# Patient Record
Sex: MALE | Race: WHITE | NOT HISPANIC OR LATINO | URBAN - METROPOLITAN AREA
[De-identification: names, ages, dates, MRNs, and addresses within clinical notes are randomized per-mention and may not be internally consistent; named-entity substitution may affect disease eponyms.]

---

## 2017-10-03 ENCOUNTER — IMPORTED ENCOUNTER (OUTPATIENT)
Dept: URBAN - METROPOLITAN AREA CLINIC 1 | Facility: CLINIC | Age: 77
End: 2017-10-03

## 2017-10-03 PROBLEM — H16.143: Noted: 2017-10-03

## 2017-10-03 PROBLEM — Z96.1: Noted: 2017-10-03

## 2017-10-03 PROBLEM — H01.001: Noted: 2017-10-03

## 2017-10-03 PROBLEM — H35.013: Noted: 2017-10-03

## 2017-10-03 PROBLEM — H01.004: Noted: 2017-10-03

## 2017-10-03 PROBLEM — H04.123: Noted: 2017-10-03

## 2017-10-03 PROBLEM — H26.493: Noted: 2017-10-03

## 2017-10-03 PROCEDURE — 92014 COMPRE OPH EXAM EST PT 1/>: CPT

## 2017-10-03 NOTE — PATIENT DISCUSSION
1.  BELL w/ PEK OU- (H/o Plugs LLs OU- plugs out LLs OU) Increase ATs to QID OU routinely. Cont AT Gel nataliia QHS OU. 2.  Anterior Blepharitis OU - Cont Daily warm compresses and lid scrubs were recommended. 3. PCO OU: (Posterior Capsule Opacification)   Observe  4. Pseudophakia OU - (Toric OU) 5. Athero Vascular Dz OU- observe. 6.  Glaucoma Suspect OU : (CD 0.55/0.4) Neg FM Hx. Past w/u negative. Cont to observe. Patient is considered Low Risk. 7.  H/o UL Blepharoplasty 8. Pterygium OS -- Patient should wear Sunglasses when exposed to UV light. Letter to PCP Return for an appointment in 1 yr 27 with Dr. Maria Luisa Villasenor.

## 2018-10-01 ENCOUNTER — IMPORTED ENCOUNTER (OUTPATIENT)
Dept: URBAN - METROPOLITAN AREA CLINIC 1 | Facility: CLINIC | Age: 78
End: 2018-10-01

## 2018-10-01 PROBLEM — H01.004: Noted: 2018-10-01

## 2018-10-01 PROBLEM — H01.001: Noted: 2018-10-01

## 2018-10-01 PROBLEM — Z96.1: Noted: 2018-10-01

## 2018-10-01 PROBLEM — H11.002: Noted: 2018-10-01

## 2018-10-01 PROBLEM — H16.143: Noted: 2018-10-01

## 2018-10-01 PROBLEM — H26.493: Noted: 2018-10-01

## 2018-10-01 PROBLEM — H35.013: Noted: 2018-10-01

## 2018-10-01 PROBLEM — H04.123: Noted: 2018-10-01

## 2018-10-01 PROBLEM — H40.013: Noted: 2018-10-01

## 2018-10-01 PROCEDURE — 92014 COMPRE OPH EXAM EST PT 1/>: CPT

## 2018-10-01 NOTE — PATIENT DISCUSSION
1.  BELL w/ PEK OU- (H/o Plugs LLs OU- puncta open OU) Recommend ATs QID OU routinely. Cont AT Gel nataliia QHS OU. 2.  Anterior Blepharitis OU - Daily warm compresses and lid scrubs were recommended. 3. PCO OU: (Posterior Capsule Opacification)   Observe and consider yag cap when pt feels pco visually significant and visual acuity decreases to appropriate level. 4. Pseudophakia OU - (Toric OU) 5. Glaucoma Suspect OU (CD 0.60/0.40): Past w/u negative. Patient is considered Low Risk. Condition was discussed with patient and patient understands. Will continue to monitor patient for any progression in condition. Patient was advised to call us with any problems questions or concerns. 6.  GR I Athero Vascular Dz OU7. Pterygium OS -- Use Sunglasses when exposed to UV light. 8.  H/o UL BlepharoplastyReturn for an appointment in 1 year 27 with Dr. Avelina Figueroa.

## 2019-10-01 ENCOUNTER — IMPORTED ENCOUNTER (OUTPATIENT)
Dept: URBAN - METROPOLITAN AREA CLINIC 1 | Facility: CLINIC | Age: 79
End: 2019-10-01

## 2019-10-01 PROBLEM — H26.493: Noted: 2019-10-01

## 2019-10-01 PROBLEM — Z96.1: Noted: 2019-10-01

## 2019-10-01 PROBLEM — H01.001: Noted: 2019-10-01

## 2019-10-01 PROBLEM — H40.013: Noted: 2019-10-01

## 2019-10-01 PROBLEM — H16.143: Noted: 2019-10-01

## 2019-10-01 PROBLEM — H35.361: Noted: 2019-10-01

## 2019-10-01 PROBLEM — H04.123: Noted: 2019-10-01

## 2019-10-01 PROBLEM — H01.004: Noted: 2019-10-01

## 2019-10-01 PROCEDURE — 92014 COMPRE OPH EXAM EST PT 1/>: CPT

## 2019-10-01 NOTE — PATIENT DISCUSSION
1.  Anterior Blepharitis OU - Continue Daily Hot compresses and lid scrubs were recommended. 2. BELL w/ PEK OU- (H/o Plugs LLs OU- puncta open OU) Recommend ATs QID OU routinely. Cont AT Gel nataliia QHS OU. 3.  Macular Drusen OD- (New) Observe. 4.  PCO OU: (Posterior Capsule Opacification)   Observe  5. Glaucoma Suspect OU (CD 0.60/0.40): Past w/u negative. 6.  Pseudophakia OU - (Toric OU) 7. GR I Athero Vascular Dz OU- stable Cont Cholesterol control. 8.  Pterygium OS -- Use Sunglasses when exposed to UV light. 9.  H/o UL BlepharoplastyLetter to PCP x deferredReturn for an appointment in 1 yr 27 with Dr. Silvio Uriarte.

## 2021-01-05 ENCOUNTER — IMPORTED ENCOUNTER (OUTPATIENT)
Dept: URBAN - METROPOLITAN AREA CLINIC 1 | Facility: CLINIC | Age: 81
End: 2021-01-05

## 2021-01-05 PROBLEM — H16.143: Noted: 2021-01-05

## 2021-01-05 PROBLEM — H26.493: Noted: 2021-01-05

## 2021-01-05 PROBLEM — H01.001: Noted: 2021-01-05

## 2021-01-05 PROBLEM — H01.004: Noted: 2021-01-05

## 2021-01-05 PROBLEM — H04.123: Noted: 2021-01-05

## 2021-01-05 PROCEDURE — 92014 COMPRE OPH EXAM EST PT 1/>: CPT

## 2021-01-05 NOTE — PATIENT DISCUSSION
1.  BELL w/ PEK OU- (H/o Plugs LLs OU- puncta open OU) Recommend ATs QID OU routinely. Cont AT Gel nataliia QHS OU.2.  PCO OU: (Posterior Capsule Opacification)   Observe  3. Anterior Blepharitis OU - Continue Daily Hot compresses and lid scrubs were recommended. 4. Macular Drusen OD- (New) Observe. 5.  Glaucoma Suspect OU (CD 0.60/0.40): Past w/u negative. 6.  Pseudophakia OU - (Toric OU) 7. GR I Athero Vascular Dz OU- stable Cont Cholesterol control. 8.  Pterygium OS -- Use Sunglasses when exposed to UV light. 9.  H/o UL Blepharoplasty Patient deferred [facility] Rx today. Return for an appointment in 1 year 27 with Dr. Maurilio Golden.

## 2022-01-10 ENCOUNTER — IMPORTED ENCOUNTER (OUTPATIENT)
Dept: URBAN - METROPOLITAN AREA CLINIC 1 | Facility: CLINIC | Age: 82
End: 2022-01-10

## 2022-01-10 PROBLEM — H35.3131: Noted: 2022-01-10

## 2022-01-10 PROCEDURE — 99214 OFFICE O/P EST MOD 30 MIN: CPT

## 2022-01-10 NOTE — PATIENT DISCUSSION
1.  ARMD OU early/dry/NEW. Importance of daily AREDS II study multivitamin and Amsler Grid checks discussed with patient. Patient to follow-up immediately with any new onset of decreased vision and/or metamorphopsia. 2. BELL w/ PEK OU - (H/o Plugs LLs OU- puncta open OU) Recommend ATs QID OU routinely. Cont AT Gel nataliia QHS OU. 3.  PCO OU - (Posterior Capsule Opacification)   Observe  4. Anterior Blepharitis OU - Continue Daily Hot compresses and lid scrubs were recommended. 5.  Glaucoma Suspect OU (CD 0.60/0.40): Past w/u negative. 6.  Pseudophakia OU - (Toric OU) 7. GR I Athero Vascular Dz OU- stable Cont Cholesterol control. 8.  Pterygium OS -- Use Sunglasses when exposed to UV light. 9.  H/o UL Blepharoplasty Patient deferred Manifest Rx today. Return for an appointment in 6 months for a DFE with Dr. Santana [place].

## 2022-03-18 PROBLEM — K40.90 RIGHT INGUINAL HERNIA: Status: ACTIVE | Noted: 2017-04-20

## 2022-03-19 PROBLEM — J33.9 NASAL POLYP: Status: ACTIVE | Noted: 2021-11-03

## 2022-03-19 PROBLEM — R42 VERTIGO: Status: ACTIVE | Noted: 2021-08-22

## 2022-03-19 PROBLEM — N13.8 BPH WITH OBSTRUCTION/LOWER URINARY TRACT SYMPTOMS: Status: ACTIVE | Noted: 2017-04-20

## 2022-03-19 PROBLEM — R27.0 ATAXIA: Status: ACTIVE | Noted: 2021-08-22

## 2022-03-19 PROBLEM — Z80.42 FAMILY HISTORY OF PROSTATE CANCER: Status: ACTIVE | Noted: 2017-04-20

## 2022-03-19 PROBLEM — R10.31 RIGHT INGUINAL PAIN: Status: ACTIVE | Noted: 2017-04-20

## 2022-03-19 PROBLEM — R10.30 LOWER ABDOMINAL PAIN: Status: ACTIVE | Noted: 2017-04-20

## 2022-03-19 PROBLEM — N28.1 RENAL CYST: Status: ACTIVE | Noted: 2017-04-20

## 2022-03-19 PROBLEM — N40.1 BPH WITH OBSTRUCTION/LOWER URINARY TRACT SYMPTOMS: Status: ACTIVE | Noted: 2017-04-20

## 2022-03-19 PROBLEM — Z95.1 HX OF CABG: Status: ACTIVE | Noted: 2020-11-13

## 2022-03-20 PROBLEM — R31.29 MICROSCOPIC HEMATURIA: Status: ACTIVE | Noted: 2017-04-20

## 2022-03-20 PROBLEM — I82.409 ACUTE DVT (DEEP VENOUS THROMBOSIS) (HCC): Status: ACTIVE | Noted: 2020-11-13

## 2022-04-03 ASSESSMENT — VISUAL ACUITY
OD_CC: 20/40-1
OS_SC: 20/25+1
OS_CC: 20/30+1
OD_SC: J1
OD_CC: 20/40
OD_PH: SC 20/25 +1
OD_CC: 20/40
OS_CC: 20/25-1
OS_SC: J1
OD_CC: 20/40-1
OD_SC: 20/25
OS_CC: 20/25
OS_SC: 20/25
OD_CC: 20/30-1
OD_SC: 20/25
OD_GLARE: 20/50
OS_CC: 20/20
OS_CC: 20/20
OS_GLARE: 20/50

## 2022-04-03 ASSESSMENT — TONOMETRY
OS_IOP_MMHG: 16
OD_IOP_MMHG: 19
OS_IOP_MMHG: 16
OD_IOP_MMHG: 18
OS_IOP_MMHG: 18
OD_IOP_MMHG: 16
OS_IOP_MMHG: 21
OS_IOP_MMHG: 19
OD_IOP_MMHG: 17
OD_IOP_MMHG: 20

## 2022-07-15 ENCOUNTER — FOLLOW UP (OUTPATIENT)
Dept: URBAN - METROPOLITAN AREA CLINIC 1 | Facility: CLINIC | Age: 82
End: 2022-07-15

## 2022-07-15 DIAGNOSIS — H40.023: ICD-10-CM

## 2022-07-15 DIAGNOSIS — H04.123: ICD-10-CM

## 2022-07-15 DIAGNOSIS — H35.3131: ICD-10-CM

## 2022-07-15 DIAGNOSIS — H16.143: ICD-10-CM

## 2022-07-15 PROCEDURE — 92134 CPTRZ OPH DX IMG PST SGM RTA: CPT

## 2022-07-15 PROCEDURE — 99214 OFFICE O/P EST MOD 30 MIN: CPT

## 2022-07-15 PROCEDURE — 92133 CPTRZD OPH DX IMG PST SGM ON: CPT

## 2022-07-15 PROCEDURE — 92015 DETERMINE REFRACTIVE STATE: CPT

## 2022-07-15 ASSESSMENT — KERATOMETRY
OS_K2POWER_DIOPTERS: 46.75
OS_K1POWER_DIOPTERS: 46.25
OD_AXISANGLE_DEGREES: 138
OD_K2POWER_DIOPTERS: 45.75
OS_AXISANGLE_DEGREES: 97
OS_AXISANGLE2_DEGREES: 7
OD_AXISANGLE2_DEGREES: 48
OD_K1POWER_DIOPTERS: 45.50

## 2022-07-15 ASSESSMENT — VISUAL ACUITY
OD_SC: 20/60
OD_BAT: 20/80
OS_BAT: 20/60
OS_SC: 20/40

## 2022-07-15 ASSESSMENT — TONOMETRY
OD_IOP_MMHG: 17
OS_IOP_MMHG: 17

## 2022-07-15 NOTE — PATIENT DISCUSSION
Stable.  Importance of daily AREDS II study multivitamin and Amsler Grid checks discussed with patient. Patient to follow-up immediately with any new onset of decreased vision and/or metamorphopsia.

## 2023-01-10 ENCOUNTER — COMPREHENSIVE EXAM (OUTPATIENT)
Dept: URBAN - METROPOLITAN AREA CLINIC 1 | Facility: CLINIC | Age: 83
End: 2023-01-10

## 2023-01-10 DIAGNOSIS — H35.3131: ICD-10-CM

## 2023-01-10 DIAGNOSIS — H26.493: ICD-10-CM

## 2023-01-10 DIAGNOSIS — H01.024: ICD-10-CM

## 2023-01-10 DIAGNOSIS — H40.023: ICD-10-CM

## 2023-01-10 DIAGNOSIS — H01.021: ICD-10-CM

## 2023-01-10 PROCEDURE — 99214 OFFICE O/P EST MOD 30 MIN: CPT

## 2023-01-10 ASSESSMENT — KERATOMETRY
OS_AXISANGLE_DEGREES: 97
OD_K2POWER_DIOPTERS: 45.75
OS_AXISANGLE2_DEGREES: 7
OD_AXISANGLE_DEGREES: 138
OS_K2POWER_DIOPTERS: 46.75
OS_K1POWER_DIOPTERS: 46.25
OD_AXISANGLE2_DEGREES: 48
OD_K1POWER_DIOPTERS: 45.50

## 2023-01-10 ASSESSMENT — VISUAL ACUITY
OD_SC: 20/50
OD_BAT: 20/80
OS_SC: 20/30+2
OS_BAT: 20/60
OU_SC: J2

## 2023-01-10 ASSESSMENT — TONOMETRY
OD_IOP_MMHG: 17
OS_IOP_MMHG: 17

## 2023-07-11 ENCOUNTER — FOLLOW UP (OUTPATIENT)
Dept: URBAN - METROPOLITAN AREA CLINIC 1 | Facility: CLINIC | Age: 83
End: 2023-07-11

## 2023-07-11 DIAGNOSIS — H35.3131: ICD-10-CM

## 2023-07-11 PROCEDURE — 99213 OFFICE O/P EST LOW 20 MIN: CPT

## 2023-07-11 PROCEDURE — 92134 CPTRZ OPH DX IMG PST SGM RTA: CPT

## 2023-07-11 ASSESSMENT — VISUAL ACUITY
OD_PH: 20/50
OD_SC: 20/50
OS_SC: J1
OS_SC: 20/30
OD_SC: J1

## 2023-07-11 ASSESSMENT — TONOMETRY
OS_IOP_MMHG: 16
OD_IOP_MMHG: 16

## 2024-01-15 ENCOUNTER — COMPREHENSIVE EXAM (OUTPATIENT)
Dept: URBAN - METROPOLITAN AREA CLINIC 1 | Facility: CLINIC | Age: 84
End: 2024-01-15

## 2024-01-15 DIAGNOSIS — H16.143: ICD-10-CM

## 2024-01-15 DIAGNOSIS — H26.493: ICD-10-CM

## 2024-01-15 DIAGNOSIS — H35.3131: ICD-10-CM

## 2024-01-15 DIAGNOSIS — H40.023: ICD-10-CM

## 2024-01-15 DIAGNOSIS — H04.123: ICD-10-CM

## 2024-01-15 PROCEDURE — 92015 DETERMINE REFRACTIVE STATE: CPT

## 2024-01-15 PROCEDURE — 99214 OFFICE O/P EST MOD 30 MIN: CPT

## 2024-01-15 ASSESSMENT — VISUAL ACUITY
OD_CC: 20/60
OS_BAT: 20/50
OS_CC: 20/25
OD_BAT: 20/60

## 2024-01-15 ASSESSMENT — TONOMETRY
OS_IOP_MMHG: 16
OD_IOP_MMHG: 16

## 2024-02-09 ENCOUNTER — CLINIC PROCEDURE ONLY (OUTPATIENT)
Dept: URBAN - METROPOLITAN AREA CLINIC 1 | Facility: CLINIC | Age: 84
End: 2024-02-09

## 2024-02-09 DIAGNOSIS — H26.493: ICD-10-CM

## 2024-02-09 DIAGNOSIS — Z96.1: ICD-10-CM

## 2024-02-09 PROCEDURE — 66821 AFTER CATARACT LASER SURGERY: CPT

## 2024-03-11 ENCOUNTER — POST-OP (OUTPATIENT)
Dept: URBAN - METROPOLITAN AREA CLINIC 1 | Facility: CLINIC | Age: 84
End: 2024-03-11

## 2024-03-11 DIAGNOSIS — Z98.890: ICD-10-CM

## 2024-03-11 DIAGNOSIS — Z96.1: ICD-10-CM

## 2024-03-11 ASSESSMENT — VISUAL ACUITY
OD_SC: 20/50
OS_SC: 20/40
OD_PH: 20/40
OS_SC: J8
OS_PH: 20/30
OD_SC: J10

## 2024-03-11 ASSESSMENT — TONOMETRY
OD_IOP_MMHG: 16
OS_IOP_MMHG: 16

## 2024-08-26 ENCOUNTER — FOLLOW UP (OUTPATIENT)
Dept: URBAN - METROPOLITAN AREA CLINIC 1 | Facility: CLINIC | Age: 84
End: 2024-08-26

## 2024-08-26 DIAGNOSIS — H40.023: ICD-10-CM

## 2024-08-26 DIAGNOSIS — H35.3131: ICD-10-CM

## 2024-08-26 PROCEDURE — 99213 OFFICE O/P EST LOW 20 MIN: CPT

## 2024-08-26 ASSESSMENT — TONOMETRY
OD_IOP_MMHG: 16
OS_IOP_MMHG: 16

## 2024-08-26 ASSESSMENT — VISUAL ACUITY
OD_SC: 20/50
OS_SC: 20/40
OD_CC: 20/25
OS_CC: 20/25

## 2025-02-18 PROBLEM — S32.401A: Status: ACTIVE | Noted: 2025-02-18

## 2025-02-18 PROBLEM — S32.402A: Status: ACTIVE | Noted: 2025-02-18

## 2025-02-24 ENCOUNTER — CARE COORDINATION (OUTPATIENT)
Dept: OTHER | Facility: CLINIC | Age: 85
End: 2025-02-24

## 2025-02-24 RX ORDER — TRAMADOL HYDROCHLORIDE 50 MG/1
50 TABLET ORAL 2 TIMES DAILY
COMMUNITY
Start: 2024-12-13

## 2025-02-24 NOTE — CARE COORDINATION
Transitions 24 Hour Call    Schedule Follow Up Appointment with PCP: Completed  Do you have a copy of your discharge instructions?: Yes  Do you have all of your prescriptions and are they filled?: Yes  Have you been contacted by a [facility] Pharmacist?: No  Have you scheduled your follow up appointment?: Yes  How are you going to get to your appointment?: Car - family or friend to transport  Do you have support at home?: Partner/Spouse/SO  Do you feel like you have everything you need to keep you well at home?: Yes  Are you an active caregiver in your home?: No  Care Transitions Interventions     Other Services: Completed (Comment: discharge instructions)            Goals Addressed                   This Visit's Progress     Conditions and Symptoms        I will schedule office visits, as directed by my provider.  I will keep my appointment or reschedule if I have to cancel.  I will notify my provider of any barriers to my plan of care.  I will notify my provider of any symptoms that indicate a worsening of my condition.    Barriers: impairment:  physical: pelvic fractures and stress  Plan for overcoming my barriers: work with CTN and providers  Confidence: 7/10  Anticipated Goal Completion Date: 3/24/25                   2/24/2025    10:31 AM   Amb Fall Risk Assessment and TUG Test   Do you feel unsteady or are you worried about falling?  yes   2 or more falls in past year? no   Fall with injury in past year? yes        Follow Up Appointment:   Discussed follow up appointments. Patient has hospital follow up appointment scheduled within 14 days of discharge.   Future Appointments         Provider Specialty Dept Phone    3/10/2025 10:30 AM Yeyo Monzon, DO              Care Transition Nurse provided contact information.  Plan for follow-up call in 6-10 days based on severity of symptoms and risk factors.  Plan for next call: follow-up appointment-are appointments scheduled  Has home health started      KANDACE MONTEIRO

## 2025-03-03 ENCOUNTER — CARE COORDINATION (OUTPATIENT)
Dept: OTHER | Facility: CLINIC | Age: 85
End: 2025-03-03

## 2025-03-03 NOTE — CARE COORDINATION
Care Transitions Note    Follow Up Call     Patient Current Location:  [place]    Care Transition Nurse contacted the patient, spouse/partner  by telephone. Verified name and  as identifiers.    Additional needs identified to be addressed with provider   No needs identified                 Method of communication with provider: none.    Care Summary Note: Follow up call to patient. CTN spoke with patient's spouse. Reviewed CTN role with [employer] and reason for call. Spouse stated that patient is doing okay. Patient is ambulating a little bit with a walker and pain is improving but patient is taking OTC pain medications. Spouse stated that she called to schedule an appointment with PCP but was unable to get patient to appointment. Home health nurse glenny blood for lab work and patient had phone appointment with PCP. Patient has follow up appointment with orthopedic next week and patient's son will be able to assist and take patient to the appointment. Spouse is agreeable to further outreach.    Plan of care updates since last contact:  Review of patient management of conditions/medications: patient is utilizing OTC pain medications for pain control  Home Health: home health continues to visit    DME: patient is ambulating with a walker  Communication with providers: patient had phone visit with PCP, son will transport patient to follow up with surgeon next week       Advance Care Planning:   Does patient have an Advance Directive: reviewed during previous call, see note. .    Medication Review:  Full medication reconciliation completed during previous call.    Remote Patient Monitoring:  Offered patient enrollment in the Remote Patient Monitoring (RPM) program for in-home monitoring: Patient is not eligible for RPM program because: affiliate provider.    Assessments:  Care Transitions Subsequent and Final Call    Schedule Follow Up Appointment with PCP: Completed  Subsequent and Final Calls  Do you have any

## 2025-03-11 ENCOUNTER — CARE COORDINATION (OUTPATIENT)
Dept: OTHER | Facility: CLINIC | Age: 85
End: 2025-03-11

## 2025-03-11 NOTE — CARE COORDINATION
Care Transitions Note    Follow Up Call     Patient Current Location:  [place]    Care Transition Nurse contacted the spouse/partner  by telephone. Verified name and  as identifiers.    Additional needs identified to be addressed with provider   No needs identified                 Method of communication with provider: none.    Care Summary Note: Follow up call to patient, spoke with spouse, Debbie. She stated that patient is continuing to improve. Pain is controlled with Tylenol and Ibuprofen. Patient is ambulating with a walker. Patient attended follow up appointment with orthopedic yesterday and has a follow up in a few weeks. Spouse stated that home health continues and therapist is present now. Discussed timeframe for fractures to heal and encouraged to continue with therapy.    Plan of care updates since last contact:  Review of patient management of conditions/medications: patient continues to take OTC pain medications for pain control  Home Health: home health continues, therapist present at this time   DME: patient is using a walker for ambulation  Communication with providers: patient attended orthopedic appointment yesterday       Advance Care Planning:   Does patient have an Advance Directive: reviewed during previous call, see note. .    Medication Review:  No changes since last call.     Remote Patient Monitoring:  Offered patient enrollment in the Remote Patient Monitoring (RPM) program for in-home monitoring: Patient is not eligible for RPM program because: affiliate provider.    Assessments:  Care Transitions Subsequent and Final Call    Schedule Follow Up Appointment with PCP: Completed  Subsequent and Final Calls  Do you have any ongoing symptoms?: Yes  Onset of Patient-reported symptoms: In the past 7 days  Patient-reported symptoms: Pain  Interventions for patient-reported symptoms: Notified PCP/Physician  Have your medications changed?: No  Do you have any questions related to your

## 2025-03-18 ENCOUNTER — CARE COORDINATION (OUTPATIENT)
Dept: OTHER | Facility: CLINIC | Age: 85
End: 2025-03-18

## 2025-03-18 NOTE — CARE COORDINATION
Care Transitions Note    Final Call     Patient Current Location:  [place]    Care Transition Nurse contacted the spouse/partner  by telephone. Verified name and  as identifiers.    Patient graduated from the Care Transitions program on 3/18/25.  Patient/family verbalizes confidence in the ability to self-manage at this time..      Advance Care Planning:   Does patient have an Advance Directive: reviewed during previous call, see note. .    Handoff:   Patient was not referred to the ACM team due to patient declined services.      Care Summary Note: Follow up call, spoke to spouse, Debbie. She stated that patient is doing well. Patient continues with home health care. Patient is ambulating with a walker and sometimes a cane. Patient's son is able to assist with transportation to appointments. Spouse had questions about reason for CTN call. CTN reviewed role with [employer] and CM program. Spouse stated that patient has support through other providers. Spouse is appreciative of CTN calls but stated that no further calls are needed. CTN will sign off.    Assessments:  Care Transitions Subsequent and Final Call    Schedule Follow Up Appointment with PCP: Completed  Subsequent and Final Calls  Do you have any ongoing symptoms?: No  Have your medications changed?: No  Do you have any questions related to your medications?: No  Do you currently have any active services?: Yes  Are you currently active with any services?: Home Health  Do you have any needs or concerns that I can assist you with?: No  Identified Barriers: None  Care Transitions Interventions     Other Services: Completed (Comment: discharge instructions)      Transportation Support: Completed   Other Interventions:              Upcoming Appointments:    Future Appointments         Provider Specialty Dept Phone    3/20/2025 9:45 AM Courtney Garcia, LILLIE - NP              Patient has agreed to contact primary care provider and/or specialist for any

## 2025-08-21 ENCOUNTER — FOLLOW UP (OUTPATIENT)
Age: 85
End: 2025-08-21

## 2025-08-21 DIAGNOSIS — Z96.1: ICD-10-CM

## 2025-08-21 DIAGNOSIS — H40.023: ICD-10-CM

## 2025-08-21 DIAGNOSIS — H26.492: ICD-10-CM

## 2025-08-21 DIAGNOSIS — H16.143: ICD-10-CM

## 2025-08-21 DIAGNOSIS — H35.3131: ICD-10-CM

## 2025-08-21 DIAGNOSIS — H04.123: ICD-10-CM

## 2025-08-21 DIAGNOSIS — H11.042: ICD-10-CM

## 2025-08-21 DIAGNOSIS — H01.021: ICD-10-CM

## 2025-08-21 DIAGNOSIS — H01.024: ICD-10-CM

## 2025-08-21 PROCEDURE — 99214 OFFICE O/P EST MOD 30 MIN: CPT

## 2025-08-21 PROCEDURE — 92134 CPTRZ OPH DX IMG PST SGM RTA: CPT

## 2025-08-21 PROCEDURE — 92015 DETERMINE REFRACTIVE STATE: CPT
